# Patient Record
Sex: FEMALE | Race: WHITE | URBAN - METROPOLITAN AREA
[De-identification: names, ages, dates, MRNs, and addresses within clinical notes are randomized per-mention and may not be internally consistent; named-entity substitution may affect disease eponyms.]

---

## 2021-06-08 PROBLEM — F33.1 MODERATE RECURRENT MAJOR DEPRESSION (HCC): Status: ACTIVE | Noted: 2021-06-08

## 2021-09-14 PROBLEM — M25.562 PATELLOFEMORAL ARTHRALGIA OF LEFT KNEE: Status: ACTIVE | Noted: 2021-09-14

## 2023-07-30 ENCOUNTER — E-VISIT (OUTPATIENT)
Dept: TELEHEALTH | Age: 28
End: 2023-07-30

## 2023-07-30 DIAGNOSIS — J02.9 SORE THROAT: Primary | ICD-10-CM

## 2023-07-30 PROCEDURE — 99421 OL DIG E/M SVC 5-10 MIN: CPT | Performed by: PHYSICIAN ASSISTANT

## 2023-07-31 ENCOUNTER — LAB ENCOUNTER (OUTPATIENT)
Dept: LAB | Age: 28
End: 2023-07-31
Attending: FAMILY MEDICINE
Payer: COMMERCIAL

## 2023-07-31 DIAGNOSIS — J02.9 SORE THROAT: ICD-10-CM

## 2023-07-31 LAB
BETA STREP GRP A SCREEN: NEGATIVE
SARS-COV-2 RNA RESP QL NAA+PROBE: NOT DETECTED

## 2023-07-31 PROCEDURE — 87635 SARS-COV-2 COVID-19 AMP PRB: CPT | Performed by: PHYSICIAN ASSISTANT

## 2023-07-31 PROCEDURE — 87081 CULTURE SCREEN ONLY: CPT | Performed by: PHYSICIAN ASSISTANT

## 2023-07-31 PROCEDURE — 87430 STREP A AG IA: CPT | Performed by: PHYSICIAN ASSISTANT

## 2023-07-31 NOTE — PROGRESS NOTES
Magda Kruse is a 28year old female. HPI:   See answers to questions above. Current Outpatient Medications   Medication Sig Dispense Refill    Norethin-Eth Estrad-Fe Biphas (LO LOESTRIN FE) 1 MG-10 MCG / 10 MCG Oral Tab Take 1 tablet by mouth daily. 28 tablet 5      No past medical history on file. No past surgical history on file. No family history on file.    Social History:  Social History     Socioeconomic History    Marital status: Single   Tobacco Use    Smoking status: Former    Smokeless tobacco: Never   Vaping Use    Vaping Use: Never used   Substance and Sexual Activity    Alcohol use: Yes     Comment: Social     Drug use: Not Currently         ASSESSMENT AND PLAN:     Sore throat  (primary encounter diagnosis)        Meds & Refills for this Visit:  Requested Prescriptions      No prescriptions requested or ordered in this encounter       Duration of  the service:  8 minutes    Patient advised to follow up with PCP if no improvement or worsening of symptoms  Refer to [employer] message for specific patient instructions

## 2023-11-28 ENCOUNTER — E-VISIT (OUTPATIENT)
Dept: TELEHEALTH | Age: 28
End: 2023-11-28
Payer: COMMERCIAL

## 2023-11-28 DIAGNOSIS — R11.2 NAUSEA AND VOMITING, UNSPECIFIED VOMITING TYPE: Primary | ICD-10-CM

## 2023-11-28 DIAGNOSIS — R19.7 DIARRHEA, UNSPECIFIED TYPE: ICD-10-CM

## 2023-11-28 PROCEDURE — 99421 OL DIG E/M SVC 5-10 MIN: CPT | Performed by: NURSE PRACTITIONER

## 2023-11-28 NOTE — PROGRESS NOTES
Tim Oquendo is a 33year old female. HPI:   See answers to questions above. Reports diarrhea and vomiting for over 1 week  Current Outpatient Medications   Medication Sig Dispense Refill    Norethin-Eth Estrad-Fe Biphas (LO LOESTRIN FE) 1 MG-10 MCG / 10 MCG Oral Tab Take 1 tablet by mouth daily. 28 tablet 5      No past medical history on file. No past surgical history on file. No family history on file. Social History:  Social History     Socioeconomic History    Marital status: Single   Tobacco Use    Smoking status: Former    Smokeless tobacco: Never   Vaping Use    Vaping Use: Never used   Substance and Sexual Activity    Alcohol use: Yes     Comment: Social     Drug use: Not Currently         ASSESSMENT AND PLAN:     Encounter Diagnoses   Name Primary? Nausea and vomiting, unspecified vomiting type Yes    Diarrhea, unspecified type      Recommended in person evaluation due to length of symptoms. Advised to go to ER for any fever, abdominal pain, or signs of dehydration.          Meds & Refills for this Visit:  Requested Prescriptions      No prescriptions requested or ordered in this encounter       Duration of  the service:  8 minutes    Patient advised to follow up with PCP if no improvement or worsening of symptoms  Refer to [employer] message for specific patient instructions

## 2024-05-31 NOTE — ED PROVIDER NOTES
Patient Seen in: [facility]      History     Chief Complaint   Patient presents with    Sore Throat     Stated Complaint: sore throat, bodyaches, diarrhea, vomiting    Subjective:   HPI  28-year-old female presents complaining of sore throat, body aches, vomiting, and diarrhea that started in the middle of the night.  Her roommates have the same symptoms.  She denies any fever.  She denies any abdominal pain.    Objective:   History reviewed. No pertinent past medical history.           History reviewed. No pertinent surgical history.             Social History     Socioeconomic History    Marital status:    Tobacco Use    Smoking status: Former    Smokeless tobacco: Never   Vaping Use    Vaping status: Never Used   Substance and Sexual Activity    Alcohol use: Yes     Comment: Social     Drug use: Yes     Types: Cannabis     Comment: edibles     Social Determinants of Health      Received from [facility]              Review of Systems   All other systems reviewed and are negative.      Positive for stated complaint: sore throat, bodyaches, diarrhea, vomiting  Other systems are as noted in HPI.  Constitutional and vital signs reviewed.      All other systems reviewed and negative except as noted above.    Physical Exam     ED Triage Vitals [05/31/24 1806]   /78   Pulse 79   Resp 18   Temp 99 °F (37.2 °C)   Temp src Temporal   SpO2 98 %   O2 Device None (Room air)       Current Vitals:   Vital Signs  BP: 134/78  Pulse: 79  Resp: 18  Temp: 99 °F (37.2 °C)  Temp src: Temporal    Oxygen Therapy  SpO2: 98 %  O2 Device: None (Room air)            Physical Exam  Vitals and nursing note reviewed.   Constitutional:       General: She is not in acute distress.     Appearance: She is well-developed. She is not ill-appearing or toxic-appearing.   HENT:      Right Ear: Tympanic membrane and ear canal normal.      Left Ear: Tympanic membrane and ear canal normal.      Nose: No  congestion or rhinorrhea.      Mouth/Throat:      Pharynx: Uvula midline. No pharyngeal swelling, oropharyngeal exudate, posterior oropharyngeal erythema or uvula swelling.      Tonsils: No tonsillar exudate or tonsillar abscesses. 0 on the right. 0 on the left.   Cardiovascular:      Rate and Rhythm: Normal rate and regular rhythm.      Heart sounds: Normal heart sounds.   Pulmonary:      Effort: Pulmonary effort is normal. No respiratory distress.      Breath sounds: Normal breath sounds. No wheezing.   Abdominal:      Tenderness: There is no abdominal tenderness.   Skin:     General: Skin is warm and dry.   Neurological:      Mental Status: She is alert and oriented to person, place, and time.               ED Course     Labs Reviewed   RAPID STREP A - Normal   POCT FLU TEST - Normal    Narrative:     This assay is a rapid molecular in vitro test utilizing nucleic acid amplification of influenza A and B viral RNA.   RAPID SARS-COV-2 BY PCR - Normal                      MDM     Medical Decision Making  28-year-old female presents complaining of sore throat, body aches, vomiting, and diarrhea that started in the middle of the night.  Her roommates have the same symptoms.  She denies any fever.  She denies any abdominal pain.    Clinical impression: Viral syndrome    Differential diagnosis: Flu, COVID, strep, virus    Strep, COVID, and flu are all negative.  Patient is nontoxic in appearance.  There is no signs of peritonsillar abscess.  Patient most likely with the same virus that her roommates have.  She has been tolerating p.o. with no recent vomiting.  She declined Zofran.  No abdominal tenderness.  She will be discharged with continued Zofran and symptomatic treatment for pain with Tylenol and Motrin.    Problems Addressed:  Viral syndrome: acute illness or injury        Disposition and Plan     Clinical Impression:  1. Viral syndrome         Disposition:  Discharge  5/31/2024  7:31 pm    Follow-up:  No  follow-up provider specified.        Medications Prescribed:  Current Discharge Medication List        START taking these medications    Details   ondansetron 4 MG Oral Tablet Dispersible Take 1 tablet (4 mg total) by mouth every 8 (eight) hours as needed.  Qty: 10 tablet, Refills: 0

## (undated) NOTE — LETTER
Date: 7/31/2023    Patient Name: Ricarda Dennis          To Whom it may concern: This letter has been written at the patient's request. The above patient was seen through telehealth encounter for evaluation of a medical condition. This patient should be excused from attending work/school from Thursday 7/27/23, Friday 7/28/23 and Monday 7/31/23. The patient may return to work/school on Tuesday 8/1/23 pending a negative covid test with no limitations.         Sincerely,    BRYAN Post, PA-C  Physician Assistant  [facility]